# Patient Record
Sex: MALE | URBAN - METROPOLITAN AREA
[De-identification: names, ages, dates, MRNs, and addresses within clinical notes are randomized per-mention and may not be internally consistent; named-entity substitution may affect disease eponyms.]

---

## 2021-05-28 ENCOUNTER — LAB OUTSIDE AN ENCOUNTER (OUTPATIENT)
Dept: URBAN - METROPOLITAN AREA CLINIC 92 | Facility: CLINIC | Age: 56
End: 2021-05-28

## 2021-05-28 ENCOUNTER — TELEPHONE ENCOUNTER (OUTPATIENT)
Dept: URBAN - METROPOLITAN AREA CLINIC 92 | Facility: CLINIC | Age: 56
End: 2021-05-28

## 2021-05-28 ENCOUNTER — OFFICE VISIT (OUTPATIENT)
Dept: URBAN - METROPOLITAN AREA CLINIC 92 | Facility: CLINIC | Age: 56
End: 2021-05-28
Payer: COMMERCIAL

## 2021-05-28 DIAGNOSIS — B18.2 CHRONIC HEPATITIS C WITHOUT HEPATIC COMA: ICD-10-CM

## 2021-05-28 PROCEDURE — 99203 OFFICE O/P NEW LOW 30 MIN: CPT | Performed by: INTERNAL MEDICINE

## 2021-05-28 NOTE — HPI-TODAY'S VISIT:
Patient with hepatitis C.  States he was first diagnosed over a year ago.  Most recent lab work active viral load however very limited records for review.  No previous treatment of hepatitis C.  Denies any current symptoms.  Unknown method of contraction.  Does have a history of HIV as well.  Denies any current alcohol abuse, drug abuse, or high risk sexual practices.  No previous colonoscopy however is not interested in pursuing screening.

## 2021-06-08 LAB
A/G RATIO: 0.9
ALBUMIN: 3.5
ALKALINE PHOSPHATASE: 80
ALT (SGPT): 73
AST (SGOT): 49
BASO (ABSOLUTE): 0
BASOS: 0
BILIRUBIN, TOTAL: 0.5
BUN/CREATININE RATIO: 12
BUN: 13
CALCIUM: 9.3
CARBON DIOXIDE, TOTAL: 25
CHLORIDE: 99
CREATININE: 1.06
EGFR IF AFRICN AM: 90
EGFR IF NONAFRICN AM: 78
EOS (ABSOLUTE): 0
EOS: 1
GLOBULIN, TOTAL: 4
GLUCOSE: 128
HBSAG SCREEN: NEGATIVE
HCV AB: 10.6
HCV GENOTYPE: (no result)
HCV LOG10: 6.07
HEMATOCRIT: 35.3
HEMATOLOGY COMMENTS:: (no result)
HEMOGLOBIN: 12.7
HEP B CORE AB, IGM: NEGATIVE
HEP B CORE AB, TOT: NEGATIVE
HEP B SURFACE AB, QUAL: NON REACTIVE
HEP BE AB: NEGATIVE
HEP BE AG: NEGATIVE
HEPATITIS C QUANTITATION: (no result)
IMMATURE CELLS: (no result)
IMMATURE GRANS (ABS): 0
IMMATURE GRANULOCYTES: 0
INTERPRETATION:: (no result)
LYMPHS (ABSOLUTE): 2
LYMPHS: 49
MCH: 35.8
MCHC: 36
MCV: 99
MONOCYTES(ABSOLUTE): 0.4
MONOCYTES: 9
NEUTROPHILS (ABSOLUTE): 1.7
NEUTROPHILS: 41
NRBC: (no result)
PLATELETS: 131
POTASSIUM: 4
PROTEIN, TOTAL: 7.5
RBC: 3.55
RDW: 11.9
SODIUM: 135
TEST INFORMATION:: (no result)
WBC: 4.2

## 2021-06-22 ENCOUNTER — OFFICE VISIT (OUTPATIENT)
Dept: URBAN - METROPOLITAN AREA CLINIC 91 | Facility: CLINIC | Age: 56
End: 2021-06-22
Payer: COMMERCIAL

## 2021-06-22 DIAGNOSIS — B18.2 CHRONIC HEPATITIS C: ICD-10-CM

## 2021-06-22 PROCEDURE — 76705 ECHO EXAM OF ABDOMEN: CPT | Performed by: INTERNAL MEDICINE

## 2021-06-28 ENCOUNTER — DASHBOARD ENCOUNTERS (OUTPATIENT)
Age: 56
End: 2021-06-28

## 2021-07-06 ENCOUNTER — OFFICE VISIT (OUTPATIENT)
Dept: URBAN - METROPOLITAN AREA CLINIC 86 | Facility: CLINIC | Age: 56
End: 2021-07-06

## 2021-07-06 PROBLEM — 128302006: Status: ACTIVE | Noted: 2021-05-28

## 2021-07-06 PROBLEM — 415116008: Status: ACTIVE | Noted: 2021-07-06

## 2021-07-06 NOTE — HPI-TODAY'S VISIT:
Patient was referred by Dr. Tommie Castro for an evaluation of hcv.  A copy of this note will be sent to the referring provider.   Pt here for hcv tx options. hx of hiv as well and is on triumeq for this which has abacavir, dolutegravir and lamivudine which has no drug interactions with epclusa. no genotype done.   Ultrasound from 6/22/2021 shows liver normal echogenicity, no liver lesion is noted, pancreas normal, gallbladder normal, right kidney normal.  No evidence of ascites.  Spleen was not mentioned

## 2021-07-06 NOTE — PHYSICAL EXAM CHEST:
I just prescribed all of these last week. Please see if she actually needs refills   no lesions,  no deformities,  no traumatic injuries,  no significant scars are present,  chest wall non-tender,  no masses present, breathing is unlabored without accessory muscle use,normal breath sounds